# Patient Record
(demographics unavailable — no encounter records)

---

## 2024-10-21 NOTE — PHYSICAL EXAM
[Placement/Patency] : tympanostomy tube in place and patent [Clear/Ventilated] : middle ear clear and well ventilated [1+] : 1+ [Clear to Auscultation] : lungs were clear to auscultation bilaterally [Increased Work of Breathing] : no increased work of breathing with use of accessory muscles and retractions [Normal muscle strength, symmetry and tone of facial, head and neck musculature] : normal muscle strength, symmetry and tone of facial, head and neck musculature [Normal] : no cervical lymphadenopathy

## 2024-10-21 NOTE — CONSULT LETTER
[Courtesy Letter:] : I had the pleasure of seeing your patient, [unfilled], in my office today. [Sincerely,] : Sincerely, [FreeTextEntry2] : Dr. Harris [address] [place] [address] [FreeTextEntry3] : Bull Samano MD Chief, Pediatric Otolaryngology [facility] and Leia Wilkins [facility] Professor of Otolaryngology [facility] [employer] at [facility]

## 2024-10-21 NOTE — HISTORY OF PRESENT ILLNESS
[No change in the review of systems as noted in prior visit date ___] : No change in the review of systems as noted in prior visit date of [unfilled] [de-identified] : Doing well after ear tube placement 9/2024 No ear infections No otorrhea No throat infections

## 2025-04-21 NOTE — CONSULT LETTER
[Courtesy Letter:] : I had the pleasure of seeing your patient, [unfilled], in my office today. [Sincerely,] : Sincerely, [FreeTextEntry2] : Dr. Harris [address] [place] [address] [FreeTextEntry3] : Bull Samano MD Chief, Pediatric Otolaryngology [facility] and Leia Wilkins [facility] Professor of Otolaryngology [facility] [employer] at [facility]

## 2025-04-21 NOTE — REASON FOR VISIT
[Subsequent Evaluation] : a subsequent evaluation for [Ear Infections] : ear infections [Mother] : mother [Nasal Obstruction] : nasal obstruction

## 2025-04-21 NOTE — HISTORY OF PRESENT ILLNESS
[No Personal or Family History of Easy Bruising, Bleeding, or Issues with General Anesthesia] : No Personal or Family History of easy bruising, bleeding, or issues with general anesthesia [de-identified] : 19 month old with ETD  Doing well after ear tube placement 9/2024 Post op audiogram normal  No speech delay  No ear infections or otorrhea   Concern for adenoid hypertrophy  Concern for nasal obstruction and possible adenoid hypertrophy No snoring  Poor sleeper  Cries during sleep and is having trouble staying asleep  No mouth breathing

## 2025-07-14 NOTE — HISTORY OF PRESENT ILLNESS
[No Personal or Family History of Easy Bruising, Bleeding, or Issues with General Anesthesia] : No Personal or Family History of easy bruising, bleeding, or issues with general anesthesia [de-identified] : 19 month old with ETD  Doing well after ear tube placement 9/2024 Post op audiogram normal  +Cefdinir and Oflox for otorrhea, on drops for 5-6 days   No speech delay  No ear infections or otorrhea   Concern for adenoid hypertrophy  Concern for nasal obstruction and possible adenoid hypertrophy No snoring  Poor sleeper  Cries during sleep and is having trouble staying asleep  No mouth breathing

## 2025-07-14 NOTE — PROCEDURE
[FreeTextEntry1] : Removal right ear foreign body [FreeTextEntry2] : Right ear foreign body [FreeTextEntry3] : Under operative microscopic visualization the foreign body is removed from the right ear canal with a curette.

## 2025-07-14 NOTE — PHYSICAL EXAM
[1+] : 1+ [Clear to Auscultation] : lungs were clear to auscultation bilaterally [Normal muscle strength, symmetry and tone of facial, head and neck musculature] : normal muscle strength, symmetry and tone of facial, head and neck musculature [Normal] : no cervical lymphadenopathy [Increased Work of Breathing] : no increased work of breathing with use of accessory muscles and retractions [FreeTextEntry8] : Foreign body (PET) [de-identified] : Perforation - wet [de-identified] : Perforation - dry - partially extruded PET

## 2025-07-14 NOTE — CONSULT LETTER
[Courtesy Letter:] : I had the pleasure of seeing your patient, [unfilled], in my office today. [Sincerely,] : Sincerely, [FreeTextEntry2] : Dr. Harris [address] [place] [address] [FreeTextEntry3] : Bull Samano MD Chief, Pediatric Otolaryngology [facility] and Leia Wilkins [facility] Professor of Otolaryngology [facility] [employer] [facility] at [facility]

## 2025-07-28 NOTE — REASON FOR VISIT
[Subsequent Evaluation] : a subsequent evaluation for [Mother] : mother [Ear Infections] : ear infections [FreeTextEntry2] : TM perforations

## 2025-07-28 NOTE — PHYSICAL EXAM
[Clear/Ventilated] : middle ear clear and well ventilated [1+] : 1+ [Clear to Auscultation] : lungs were clear to auscultation bilaterally [Normal muscle strength, symmetry and tone of facial, head and neck musculature] : normal muscle strength, symmetry and tone of facial, head and neck musculature [Normal] : no cervical lymphadenopathy [Increased Work of Breathing] : no increased work of breathing with use of accessory muscles and retractions [de-identified] : PET mostly extruded

## 2025-07-28 NOTE — HISTORY OF PRESENT ILLNESS
[de-identified] : 22 month old girl hx of ETD Presents for 2 week follow up for b/l TM perforations  Hx of tube placement 09/2024 - believes right tube out. Completed drops as prescribed.  No drainage noted recently.  2 flights within past 2 weeks This morning with touching ears No hearing or speech concerns.   Previously with concerns for adenoid hypertophy No nasal congestion concerns today No snoring No mouth breathing

## 2025-07-28 NOTE — CONSULT LETTER
[Courtesy Letter:] : I had the pleasure of seeing your patient, [unfilled], in my office today. [Sincerely,] : Sincerely, [FreeTextEntry2] : Mariajose Stuart [address] [facility] [place] [place], [place] [id number] [phone] [FreeTextEntry3] : Bull Samano MD Chief, Pediatric Otolaryngology [facility] and Leia Wilkins [facility] Professor of Otolaryngology [facility] [employer] at [facility]